# Patient Record
Sex: FEMALE | Race: WHITE | NOT HISPANIC OR LATINO | URBAN - METROPOLITAN AREA
[De-identification: names, ages, dates, MRNs, and addresses within clinical notes are randomized per-mention and may not be internally consistent; named-entity substitution may affect disease eponyms.]

---

## 2022-03-31 ENCOUNTER — OFFICE (OUTPATIENT)
Dept: URBAN - METROPOLITAN AREA CLINIC 67 | Facility: CLINIC | Age: 72
End: 2022-03-31

## 2022-03-31 VITALS
HEART RATE: 72 BPM | SYSTOLIC BLOOD PRESSURE: 130 MMHG | HEIGHT: 63 IN | DIASTOLIC BLOOD PRESSURE: 78 MMHG | WEIGHT: 146 LBS

## 2022-03-31 DIAGNOSIS — K21.9 GASTRO-ESOPHAGEAL REFLUX DISEASE WITHOUT ESOPHAGITIS: ICD-10-CM

## 2022-03-31 PROCEDURE — 99203 OFFICE O/P NEW LOW 30 MIN: CPT | Performed by: SPECIALIST

## 2022-03-31 RX ORDER — MAGNESIUM CARB/ALUMINUM HYDROX 105-160MG
480 TABLET,CHEWABLE ORAL
Qty: 90 | Refills: 1 | Status: ACTIVE
Start: 2022-03-31

## 2022-03-31 NOTE — SERVICENOTES
Discussed with patient PPI and potential osteoporosis risk. Patient should discuss with PCP the need for more frequent bone density testing.  Vitamin D and calcium supplements should be considered.  May also consider drug holiday. Also discussed possible association with dementia and renal insufficiency that may not be causal.  Avoidance of any unnecessary medications is wise.

## 2022-03-31 NOTE — SERVICEHPINOTES
Pt with GERD and takes Pantoprazole.  Prior issues with osteoporosis and no celiac on histology.  Pt with hyperparathyroidism Dr Harman,  More nocturnal refluxbr
br   Prior EGD 12/6/99, 4/28/09, 7/17/15
br Colon 12/69//, 11/30/06, 8/17/09, 6/29/17